# Patient Record
Sex: FEMALE | URBAN - METROPOLITAN AREA
[De-identification: names, ages, dates, MRNs, and addresses within clinical notes are randomized per-mention and may not be internally consistent; named-entity substitution may affect disease eponyms.]

---

## 2021-05-27 ENCOUNTER — OFFICE VISIT (OUTPATIENT)
Dept: URBAN - METROPOLITAN AREA CLINIC 13 | Facility: CLINIC | Age: 86
End: 2021-05-27
Payer: MEDICARE

## 2021-05-27 DIAGNOSIS — H43.813 BILATERAL VITREOUS DETACHMENT OF EYES: ICD-10-CM

## 2021-05-27 DIAGNOSIS — H26.493 OTHER SECONDARY CATARACT, BILATERAL: Primary | ICD-10-CM

## 2021-05-27 DIAGNOSIS — Z96.1 PRESENCE OF PSEUDOPHAKIA: ICD-10-CM

## 2021-05-27 PROCEDURE — 92134 CPTRZ OPH DX IMG PST SGM RTA: CPT | Performed by: OPHTHALMOLOGY

## 2021-05-27 PROCEDURE — 99204 OFFICE O/P NEW MOD 45 MIN: CPT | Performed by: OPHTHALMOLOGY

## 2021-05-27 ASSESSMENT — INTRAOCULAR PRESSURE
OS: 16
OD: 15

## 2021-05-27 NOTE — IMPRESSION/PLAN
Impression: Other secondary cataract, bilateral: H26.493. Bilateral. Plan: Patient presents to rule out prior ION OD. Patient denies any episode sudden loss of vision. There is no definitive optic nerve  pallor OU. Patient does have a visually significant PCO OD. Recommend YAG laser OD. Follow up with Dr. Domingo Pham for Yag OD. Return PRN, OCT OU

## 2021-05-27 NOTE — IMPRESSION/PLAN
Impression: Bilateral vitreous detachment of eyes: H43.813. Bilateral. Plan: PVD with no retinal break or retinal detachment OU. Exam/OCT reveals no ERM/ME OU. Reviewed signs and symptoms of a retinal tear and detachment. Advised to return immediately for new floaters, flashing lights or a shadow in the vision.